# Patient Record
Sex: FEMALE | Race: WHITE | NOT HISPANIC OR LATINO | URBAN - NONMETROPOLITAN AREA
[De-identification: names, ages, dates, MRNs, and addresses within clinical notes are randomized per-mention and may not be internally consistent; named-entity substitution may affect disease eponyms.]

---

## 2021-07-01 ENCOUNTER — IMPORTED ENCOUNTER (OUTPATIENT)
Dept: URBAN - NONMETROPOLITAN AREA CLINIC 1 | Facility: CLINIC | Age: 66
End: 2021-07-01

## 2021-07-01 PROCEDURE — 92004 COMPRE OPH EXAM NEW PT 1/>: CPT

## 2021-07-01 PROCEDURE — 92015 DETERMINE REFRACTIVE STATE: CPT

## 2021-07-01 NOTE — PATIENT DISCUSSION
ERM OD - discussed diagnosis in detail w/ patient - discussed signs and symptoms associated - order mac OCT at next visit - RTC in 6 months for follow up Cataracts OU - discussed diagnosis in detail w/ patient - discussed signs and symptoms associated - recommend UV protection - continue to monitorHyperopia OU/Astigmatism OD - discussed findings in detail w/ patient - new spectacle Rx issued today - continue to monitor; Dr's Notes: MR 7/1/2021DFE 7/1/2021

## 2021-07-23 PROBLEM — H52.223: Noted: 2018-03-19

## 2021-07-23 PROBLEM — H25.13: Noted: 2021-07-23

## 2021-07-23 PROBLEM — H52.4: Noted: 2018-03-19

## 2021-07-23 PROBLEM — H35.371: Noted: 2018-03-19

## 2021-07-23 PROBLEM — H52.03: Noted: 2018-03-19

## 2022-04-09 ASSESSMENT — TONOMETRY
OD_IOP_MMHG: 17
OS_IOP_MMHG: 17

## 2022-04-09 ASSESSMENT — VISUAL ACUITY
OD_PH: 20/25
OD_SC: 20/50
OU_CC: 20/20
OS_SC: 20/25-1